# Patient Record
Sex: FEMALE | Race: ASIAN | Employment: FULL TIME | URBAN - METROPOLITAN AREA
[De-identification: names, ages, dates, MRNs, and addresses within clinical notes are randomized per-mention and may not be internally consistent; named-entity substitution may affect disease eponyms.]

---

## 2018-06-08 ENCOUNTER — THERAPY VISIT (OUTPATIENT)
Dept: PHYSICAL THERAPY | Facility: CLINIC | Age: 33
End: 2018-06-08
Payer: COMMERCIAL

## 2018-06-08 DIAGNOSIS — M54.2 CERVICALGIA: ICD-10-CM

## 2018-06-08 DIAGNOSIS — G44.219 EPISODIC TENSION-TYPE HEADACHE, NOT INTRACTABLE: ICD-10-CM

## 2018-06-08 DIAGNOSIS — M26.609 TEMPOROMANDIBULAR DYSFUNCTION SYNDROME: Primary | ICD-10-CM

## 2018-06-08 PROCEDURE — 97110 THERAPEUTIC EXERCISES: CPT | Mod: GP | Performed by: PHYSICAL THERAPIST

## 2018-06-08 PROCEDURE — 97140 MANUAL THERAPY 1/> REGIONS: CPT | Mod: GP | Performed by: PHYSICAL THERAPIST

## 2018-06-08 PROCEDURE — 97161 PT EVAL LOW COMPLEX 20 MIN: CPT | Mod: GP | Performed by: PHYSICAL THERAPIST

## 2018-06-08 NOTE — LETTER
[facility] ATHLETIC [facility] PHYSICAL THERAPY  [address]  [facility] [address]  [phone]    2018    Re: Adrianna Rehman   :   1985  MRN:  [id number]   REFERRING PHYSICIAN:   Maria Isabel Harris    [facility]  Date of Initial Evaluation:  18  Visits:  Rxs Used: 1  Reason for Referral:     Temporomandibular dysfunction syndrome  Cervicalgia  Episodic tension-type headache, not intractable    EVALUATION SUMMARY    [facility]    Subjective:  Patient is a 33 year old female presenting with rehab tmj hpi. The history is provided by the patient. No  was used.   Adrianna Rehman is a 33 year old female with a TMJ right condition.  Condition occurred with:  Other reason.  Condition occurred: in the community.  This is a new condition  In 2018 patient was [place] when she yawned to clear her ears and had a pop in the right jaw with pain and inability to close her jaw for about 10 seconds.  She has pain for 1 day and then in May she had the return of right jaw pain the day after playing volleyball. Pain has persisted and she saw Dr. Harris on  and referred to PT. She has no prior history of jaw pain or clicking. Chief complaints are of constant right face and neck pain and tightness which is worse with jaw opening and chewing. She has headaches twice a week which develop during the day.   Patient reports pain:  Face right, TMJ right and cervical right.  Radiates to:  Head.  Pain is described as aching and is constant Pain Scale: 2/10 at rest, 7/10 with activity.  Associated symptoms:  Joint noise, muscle tightness, tired or painful jaw muscles, headache cervical, headache temporal, locking: open or closed and stiffness/limited opening. Pain is the same all the time.  Symptoms are exacerbated by bushing/flossing,  chewing, talking, clenching, opening and yawning and relieved by nothing.  Since onset symptoms are gradually worsening.  General health as reported by patient is good.  Past medical history: prediabetes.  Medical allergies: no.  Other surgeries include:  None reported.  Current medications:  None as reported by the patient.  Current occupation is [employer].  Patient is working in normal job without restrictions.  Employment tasks: walking.    Objective:  Standing Alignment:    Cervical/Thoracic:  Forward head  Shoulder/UE:  Rounded shoulders, protracted scapula L and protracted scapula R        Leakhna Sherie       TMJ Evaluation  AROM Cervical:    Flexion: 100% Overpressure: Pain:  Extension: 100% R neck pain Overpressure: Pain:  Left Side Bend: 90% with R neck stretc Overpressure: Pain:  Right Side Bend: 100% Overpressure: Pain:  Left Rotation: 100% Overpressure: Pain:  Right Rotation: 100% Overpressure: Pain:  AROM TMJ:  Openin mm     Left Laterotrusion:  5 mm    Right Laterotrusion:  10  mm    Associated Findings: Headaches:  Cervical and temporal (twice per week developing during the day)  Parafunctional Habits:  Parafunctional habits: constant muscle guarding and fear avoidance reaction, very distraught.  Bruxism:  Unertain  Mandibular Habits:  No   Chewing/Biting Nails:  Left unilatilateral chew, left lip biting    Clenching:  Day and night    Caffeine:  Less than 1 cup per day    Aerobic Exercise:  Runs 3 miles twice per week, with increased pain the next day  Sleep Quality:  Poor sleep quality, wakes several time per night  Previous Interventions:    Dental/Orthotics:  Upper and lower retainers nightly, invisalign 1 year ago  Palpation:  Palpation/muscle tone tmj: moderate right greater than left with poor relaxation.  Left side tenderness present at:  Upper Trap; Levator; Erector Spinae; Superficial Masseter; Deep Masseter; Temporalis and Lateral Pterygoid  Right side tenderness present at:   Upper Trap; Levator; Erector Spinae; Superficial Masseter; Deep Masseter; Temporalis and Lateral Pterygoid  Movement Characteristics:    Click:  None noted, but guarded and limited motion  Deviations:  S curve deviation with deviation to L first, then R  Subluxation:  Mild bilaterally  Early Translation:  First L, then R    TMJ Findings:    Tongue Positioning:  Not palatal, teeth touch at rest  Mucosal Ridging:  None  Tongue Scalloping:  Some  Capsule Palpation:  Left side tenderness present at :  Lateral Capsule; Superior Capsule and Posterior Capsule  Right side tenderness not present at:  Lateral Capsule; Superior Capsule or Posterior Capsule                    Leakhna Sherie         Assessment/Plan:    Patient is a 33 year old female with right side TMJ complaints.    Patient has the following significant findings with corresponding treatment plan.                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    Comorbidity factors that impact the plan of care are:      pre diabetes.     Medications impacting care: None.  2) Examination of Body Systems comprised of:   Body structures and functions that impact the plan of care:      TMJ.   Activity limitations that impact the plan of care are:      eating, opening mouth, yawning.  3) Clinical presentation characteristics are:   Stable/Uncomplicated.  4) Decision-Making    Low complexity using standardized patient assessment instrument and/or measureable assessment of functional  outcome.  Cumulative Therapy Evaluation is: Low complexity.  Previous and current functional limitations:  (See Goal Flow Sheet for this information)    Short term and Long term goals: (See Goal Flow Sheet for this information)   Communication ability:  Patient appears to be able to clearly communicate and understand verbal and written communication and follow directions correctly.  Treatment Explanation - The following has been discussed with the patient:   RX ordered/plan of care  Anticipated outcomes  Possible risks and side effects  This patient would benefit from PT intervention to resume normal activities.   Rehab potential is good.  Frequency:  1 X week, once daily  Duration:  for 8 weeks  Discharge Plan:  Achieve all LTG.  Independent in home treatment program.  Reach maximal therapeutic benefit.    Please refer to the daily flowsheet for treatment today, total treatment time and time spent performing 1:1 timed codes.       Adrianna Rehman         Thank you for your referral.    INQUIRIES  Therapist: Kimberly Leon, PT  [facility] PH[facility]ICAL THERAP[facility]  [id number] [place] [address]  Phone: [phone]  Fax: [phone]

## 2018-06-08 NOTE — MR AVS SNAPSHOT
After Visit Summary   6/8/2018    Adrianna Rehman    MRN: [id number]           Patient Information     Date Of Birth          1985        Visit Information        Provider Department      6/8/2018 10:10 AM Kimberly Leon, [facility] [place] [facility] Physical Therapy        Today's Diagnoses     Temporomandibular dysfunction syndrome    -  1    Cervicalgia        Episodic tension-type headache, not intractable           Follow-ups after your visit        Your next 10 appointments already scheduled     Cooper 15, 2018  3:20 PM CDT   RAJAT Spine with Kimberly Leon PT   [facility] ([facility])    [address]  [facility] [id number] [phone]            Jun 22, 2018  3:20 PM CDT   RAJAT Spine with Kimberly Leon PT   [facility] [place] [facility] ([facility])    [address]  [facility] [address] [phone]              Who to contact     If you have questions or need follow up information about today's clinic visit or your schedule please contact [facility] ATHLETIC [facility] PHYSICAL THERAPY directly at [phone].  Normal or non-critical lab and imaging results will be communicated to you by MyChart, letter or phone within 4 business days after the clinic has received the results. If you do not hear from us within 7 days, please contact the clinic through [employer] or phone. If you have a critical or abnormal lab result, we will notify you by phone as soon as possible.  Submit refill requests through [email or web address] or call your pharmacy and they will forward the refill request to us. Please allow 3 business days for your refill to be completed.          Additional Information About Your Visit        [employer] Information     [email or web address] lets you send messages to your doctor, view your test results, renew your prescriptions, schedule appointments and more. To sign up, go to  [email or web address] . Click on \"Log in\" on the left side of the screen, which will take you to the Welcome page. Then click on \"Sign up Now\" on the right side of the page.     You will be asked to enter the access code listed below, as well as some personal information. Please follow the directions to create your username and password.     Your access code is: [email or web address]  Expires: 2018  6:01 PM     Your access code will  in 90 days. If you need help or a new code, please call your [place] clinic or [phone].        Care EveryWhere ID     This is your Care EveryWhere ID. This could be used by other organizations to access your [place] medical records  [id number]         Blood Pressure from Last 3 Encounters:   No data found for BP    Weight from Last 3 Encounters:   No data found for Wt              We Performed the Following     HC PT EVAL, LOW COMPLEXITY     RAJAT INITIAL EVAL REPORT     MANUAL THER TECH,1+REGIONS,EA 15 MIN     THERAPEUTIC EXERCISES        Primary Care Provider Office Phone # Fax #    [facility] [phone]       [address] [place] [address], [address]  [facility] [address]        Equal Access to Services     DORITA HESS AH: Hadii tyson white hadasho Soomaali, waaxda luqadaha, qaybta kaalmada adeegyada, merlene negrete. So [facility] [phone].    ATENCIÓN: Si habla español, tiene a ramos disposición servicios gratuitos de asistencia lingüística. Llame al [phone].    We comply with applicable federal civil rights laws and [place] laws. We do not discriminate on the basis of race, color, national origin, age, disability, sex, sexual orientation, or gender identity.            Thank you!     Thank you for choosing [facility] PHYSICAL THERAPY  for your care. Our goal is always to provide you with excellent care. Hearing back from our patients is one way we can continue to improve our services. Please take a few " minutes to complete the written survey that you may receive in the mail after your visit with us. Thank you!             Your Updated Medication List - Protect others around you: Learn how to safely use, store and throw away your medicines at www.disposemymeds.org.      Notice  As of 6/8/2018  6:01 PM    You have not been prescribed any medications.

## 2018-06-08 NOTE — LETTER
[facility] ATHLETIC [facility] PHYSICAL THERAPY  [address]  [facility] [address]  [phone]    August 10, 2018    Re: Adrianna Rehman   :   1985  MRN:  [id number]   REFERRING PHYSICIAN:   Maria Isabel Harris    [facility]  Date of Initial Evaluation:  18  Visits:  Rxs Used: 1  Reason for Referral: Temporomandibular dysfunction syndrome  Cervicalgia; Episodic tension-type headache, not intractable    EVALUATION SUMMARY    Addendum:  Patient's friend called and cancelled appointments on 6/15/18 and 18 stating she did not need them as her jaw was feeling better. Patient will be discharge after her initial evaluation with a minimal home exercise program.      [facility] Evaluation  Subjective:  Patient is a 33 year old female presenting with rehab tmj hpi. The history is provided by the patient. No  was used.   Adrianna Rehman is a 33 year old female with a TMJ right condition.  Condition occurred with:  Other reason.  Condition occurred: in the community.  This is a new condition  In 2018 patient was at the [place] when she yawned to clear her ears and had a pop in the right jaw with pain and inability to close her jaw for about 10 seconds.  She has pain for 1 day and then in May she had the return of right jaw pain the day after playing volleyball. Pain has persisted and she saw Dr. Harris on  and referred to PT. She has no prior history of jaw pain or clicking. Chief complaints are of constant right face and neck pain and tightness which is worse with jaw opening and chewing. She has headaches twice a week which develop during the day.   Patient reports pain:  Face right, TMJ right and cervical right.  Radiates to:  Head.  Pain is described as aching and is constant Pain Scale: 2/10 at rest, 7/10 with activity.  Associated  symptoms:  Joint noise, muscle tightness, tired or painful jaw muscles, headache cervical, headache temporal, locking: open or closed and stiffness/limited opening. Pain is the same all the time.  Symptoms are exacerbated by bushing/flossing, chewing, talking, clenching, opening and yawning and relieved by nothing.  Since onset symptoms are gradually worsening.        General health as reported by patient is good.  Past medical history: prediabetes.  Medical allergies: no.  Other surgeries include:  None reported.  Current medications:  None as reported by the patient.  Current occupation is [employer].  Patient is working in normal job without restrictions.  Employment tasks: walking.               Objective:  Standing Alignment:    Cervical/Thoracic:  Forward head  Shoulder/UE:  Rounded shoulders, protracted scapula L and protracted scapula R          Re: Leakhna Sherie   :   1985        TMJ Evaluation  ROM:   AROM Cervical:    Flexion: 100% Overpressure: Pain:  Extension: 100% R neck pain Overpressure: Pain:  Left Side Bend: 90% with R neck stretc Overpressure: Pain:  Right Side Bend: 100% Overpressure: Pain:  Left Rotation: 100% Overpressure: Pain:  Right Rotation: 100% Overpressure: Pain:  AROM TMJ:  Openin mm     Left Laterotrusion:  5 mm    Right Laterotrusion:  10  mm    Associated Findings: Headaches:  Cervical and temporal (twice per week developing during the day)  Parafunctional Habits:  Parafunctional habits: constant muscle guarding and fear avoidance reaction, very distraught.  Bruxism:  Unertain  Mandibular Habits:  No   Chewing/Biting Nails:  Left unilatilateral chew, left lip biting    Clenching:  Day and night    Caffeine:  Less than 1 cup per day    Aerobic Exercise:  Runs 3 miles twice per week, with increased pain the next day  Sleep Quality:  Poor sleep quality, wakes several time per night  Previous Interventions:    Dental/Orthotics:  Upper and lower retainers nightly,  invisalign 1 year ago  Palpation:  Palpation/muscle tone tmj: moderate right greater than left with poor relaxation.  Left side tenderness present at:  Upper Trap; Levator; Erector Spinae; Superficial Masseter; Deep Masseter; Temporalis and Lateral Pterygoid  Right side tenderness present at:  Upper Trap; Levator; Erector Spinae; Superficial Masseter; Deep Masseter; Temporalis and Lateral Pterygoid  Movement Characteristics:    Click:  None noted, but guarded and limited motion  Deviations:  S curve deviation with deviation to L first, then R  Subluxation:  Mild bilaterally  Early Translation:  First L, then R    TMJ Findings:    Tongue Positioning:  Not palatal, teeth touch at rest  Mucosal Ridging:  None  Tongue Scalloping:  Some  Capsule Palpation:  Left side tenderness present at :  Lateral Capsule; Superior Capsule and Posterior Capsule  Right side tenderness not present at:  Lateral Capsule; Superior Capsule or Posterior Capsule       Assessment/Plan:    Patient is a 33 year old female with right side TMJ complaints.    Patient has the following significant findings with corresponding treatment plan.                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    Comorbidity factors that impact the plan of care are:      pre diabetes.     Medications impacting care: None.  2) Examination of Body Systems comprised of:   Body structures and functions that impact the plan of care:       TMJ.   Activity limitations that impact the plan of care are:      eating, opening mouth, yawning.  3) Clinical presentation characteristics are:   Stable/Uncomplicated.  4) Decision-Making    Low complexity using standardized patient assessment instrument and/or measureable assessment of functional outcome.  Cumulative Therapy Evaluation is: Low complexity.    Previous and current functional limitations:  (See Goal Flow Sheet for this information)    Short term and Long term goals: (See Goal Flow Sheet for this information)     Communication ability:  Patient appears to be able to clearly communicate and understand verbal and written communication and follow directions correctly.  Treatment Explanation - The following has been discussed with the patient:   RX ordered/plan of care  Anticipated outcomes  Possible risks and side effects  This patient would benefit from PT intervention to resume normal activities.   Rehab potential is good.    Frequency:  1 X week, once daily  Duration:  for 8 weeks  Discharge Plan:  Achieve all LTG.  Independent in home treatment program.  Reach maximal therapeutic benefit.    Thank you for your referral.    INQUIRIES  Therapist: Kimberly Leon, PT   [facility] [place] [facility]  [id number] [place] [address] [address] [address]  Phone: [phone]  Fax: [phone]

## 2018-06-08 NOTE — PROGRESS NOTES
Addendum:  Patient's friend called and cancelled appointments on 6/15/18 and 6/22/18 stating she did not need them as her jaw was feeling better. Patient will be discharge after her initial evaluation with a minimal home exercise program.      [facility] Initial Evaluation  Subjective:  Patient is a 33 year old female presenting with rehab tmj hpi. The history is provided by the patient. No  was used.   Adrianna Rehman is a 33 year old female with a TMJ right condition.  Condition occurred with:  Other reason.  Condition occurred: in the community.  This is a new condition  In February 2018 patient was at the [place] when she yawned to clear her ears and had a pop in the right jaw with pain and inability to close her jaw for about 10 seconds.  She has pain for 1 day and then in May she had the return of right jaw pain the day after playing volleyball. Pain has persisted and she saw Dr. Harris on June 5th and referred to PT. She has no prior history of jaw pain or clicking. Chief complaints are of constant right face and neck pain and tightness which is worse with jaw opening and chewing. She has headaches twice a week which develop during the day. .    Patient reports pain:  Face right, TMJ right and cervical right.  Radiates to:  Head.  Pain is described as aching and is constant Pain Scale: 2/10 at rest, 7/10 with activity.  Associated symptoms:  Joint noise, muscle tightness, tired or painful jaw muscles, headache cervical, headache temporal, locking: open or closed and stiffness/limited opening. Pain is the same all the time.  Symptoms are exacerbated by bushing/flossing, chewing, talking, clenching, opening and yawning and relieved by nothing.  Since onset symptoms are gradually worsening.        General health as reported by patient is good.  Past medical history: prediabetes.  Medical allergies: no.  Other surgeries include:  None reported.  Current  medications:  None as reported by the patient.  Current occupation is [employer].  Patient is working in normal job without restrictions.  Employment tasks: walking.                                Objective:  Standing Alignment:    Cervical/Thoracic:  Forward head  Shoulder/UE:  Rounded shoulders, protracted scapula L and protracted scapula R                                                          TMJ Evaluation  ROM:     AROM Cervical:    Flexion: 100% Overpressure: Pain:  Extension: 100% R neck pain Overpressure: Pain:  Left Side Bend: 90% with R neck stretc Overpressure: Pain:  Right Side Bend: 100% Overpressure: Pain:  Left Rotation: 100% Overpressure: Pain:  Right Rotation: 100% Overpressure: Pain:  AROM TMJ:  Openin mm     Left Laterotrusion:  5 mm    Right Laterotrusion:  10  mm          Associated Findings: Headaches:  Cervical and temporal (twice per week developing during the day)  Parafunctional Habits:  Parafunctional habits: constant muscle guarding and fear avoidance reaction, very distraught.  Bruxism:  Unertain  Mandibular Habits:  No   Chewing/Biting Nails:  Left unilatilateral chew, left lip biting    Clenching:  Day and night    Caffeine:  Less than 1 cup per day    Aerobic Exercise:  Runs 3 miles twice per week, with increased pain the next day  Sleep Quality:  Poor sleep quality, wakes several time per night    Previous Interventions:      Dental/Orthotics:  Upper and lower retainers nightly, invisalign 1 year ago  Palpation:  Palpation/muscle tone tmj: moderate right greater than left with poor relaxation.  Left side tenderness present at:  Upper Trap; Levator; Erector Spinae; Superficial Masseter; Deep Masseter; Temporalis and Lateral Pterygoid  Right side tenderness present at:  Upper Trap; Levator; Erector Spinae; Superficial Masseter; Deep Masseter; Temporalis and Lateral Pterygoid    Movement Characteristics:    Click:  None noted, but guarded and limited  motion    Deviations:  S curve deviation with deviation to L first, then R  Subluxation:  Mild bilaterally  Early Translation:  First L, then R    TMJ Findings:    Tongue Positioning:  Not palatal, teeth touch at rest  Mucosal Ridging:  None  Tongue Scalloping:  Some      Capsule Palpation:  Left side tenderness present at :  Lateral Capsule; Superior Capsule and Posterior Capsule    Right side tenderness not present at:  Lateral Capsule; Superior Capsule or Posterior Capsule                General     ROS    Assessment/Plan:    Patient is a 33 year old female with right side TMJ complaints.    Patient has the following significant findings with corresponding treatment plan.                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    Comorbidity factors that impact the plan of care are:      pre diabetes.     Medications impacting care: None.  2) Examination of Body Systems comprised of:   Body structures and functions that impact the plan of care:      TMJ.   Activity limitations that impact the plan of care are:      eating, opening mouth, yawning.  3) Clinical presentation characteristics are:   Stable/Uncomplicated.  4) Decision-Making    Low complexity using standardized patient assessment instrument and/or measureable assessment of functional outcome.  Cumulative Therapy Evaluation is: Low complexity.    Previous and current functional limitations:  (See Goal Flow Sheet for this information)    Short term and Long term  goals: (See Goal Flow Sheet for this information)     Communication ability:  Patient appears to be able to clearly communicate and understand verbal and written communication and follow directions correctly.  Treatment Explanation - The following has been discussed with the patient:   RX ordered/plan of care  Anticipated outcomes  Possible risks and side effects  This patient would benefit from PT intervention to resume normal activities.   Rehab potential is good.    Frequency:  1 X week, once daily  Duration:  for 8 weeks  Discharge Plan:  Achieve all LTG.  Independent in home treatment program.  Reach maximal therapeutic benefit.    Please refer to the daily flowsheet for treatment today, total treatment time and time spent performing 1:1 timed codes.

## 2018-08-10 PROBLEM — M54.2 CERVICALGIA: Status: RESOLVED | Noted: 2018-06-08 | Resolved: 2018-08-10

## 2018-08-10 PROBLEM — G44.219 EPISODIC TENSION-TYPE HEADACHE, NOT INTRACTABLE: Status: RESOLVED | Noted: 2018-06-08 | Resolved: 2018-08-10

## 2018-08-10 PROBLEM — M26.609 TEMPOROMANDIBULAR DYSFUNCTION SYNDROME: Status: RESOLVED | Noted: 2018-06-08 | Resolved: 2018-08-10

## 2018-11-30 ENCOUNTER — APPOINTMENT (OUTPATIENT)
Dept: CT IMAGING | Facility: CLINIC | Age: 33
End: 2018-11-30
Attending: EMERGENCY MEDICINE
Payer: COMMERCIAL

## 2018-11-30 ENCOUNTER — APPOINTMENT (OUTPATIENT)
Dept: ULTRASOUND IMAGING | Facility: CLINIC | Age: 33
End: 2018-11-30
Attending: EMERGENCY MEDICINE
Payer: COMMERCIAL

## 2018-11-30 ENCOUNTER — HOSPITAL ENCOUNTER (EMERGENCY)
Facility: CLINIC | Age: 33
Discharge: HOME | End: 2018-11-30
Attending: EMERGENCY MEDICINE | Admitting: EMERGENCY MEDICINE
Payer: COMMERCIAL

## 2018-11-30 VITALS
HEART RATE: 74 BPM | BODY MASS INDEX: 23.37 KG/M2 | OXYGEN SATURATION: 100 % | WEIGHT: 127 LBS | HEIGHT: 62 IN | TEMPERATURE: 98.4 F | RESPIRATION RATE: 16 BRPM | SYSTOLIC BLOOD PRESSURE: 128 MMHG | DIASTOLIC BLOOD PRESSURE: 40 MMHG

## 2018-11-30 DIAGNOSIS — N20.1 URETEROLITHIASIS: ICD-10-CM

## 2018-11-30 DIAGNOSIS — N23 RENAL COLIC: ICD-10-CM

## 2018-11-30 LAB
ALBUMIN SERPL-MCNC: 3.8 G/DL (ref 3.4–5)
ALBUMIN UR-MCNC: NEGATIVE MG/DL
ALP SERPL-CCNC: 80 U/L (ref 40–150)
ALT SERPL W P-5'-P-CCNC: 20 U/L (ref 0–50)
ANION GAP SERPL CALCULATED.3IONS-SCNC: 7 MMOL/L (ref 3–14)
APPEARANCE UR: CLEAR
AST SERPL W P-5'-P-CCNC: 16 U/L (ref 0–45)
B-HCG FREE SERPL-ACNC: <5 IU/L
BACTERIA #/AREA URNS HPF: ABNORMAL /HPF
BASOPHILS # BLD AUTO: 0 10E9/L (ref 0–0.2)
BASOPHILS NFR BLD AUTO: 0.4 %
BILIRUB SERPL-MCNC: 0.4 MG/DL (ref 0.2–1.3)
BILIRUB UR QL STRIP: NEGATIVE
BUN SERPL-MCNC: 11 MG/DL (ref 7–30)
CALCIUM SERPL-MCNC: 8.4 MG/DL (ref 8.5–10.1)
CHLORIDE SERPL-SCNC: 107 MMOL/L (ref 94–109)
CO2 SERPL-SCNC: 25 MMOL/L (ref 20–32)
COLOR UR AUTO: YELLOW
CREAT SERPL-MCNC: 0.55 MG/DL (ref 0.52–1.04)
DIFFERENTIAL METHOD BLD: NORMAL
EOSINOPHIL # BLD AUTO: 0.1 10E9/L (ref 0–0.7)
EOSINOPHIL NFR BLD AUTO: 0.8 %
ERYTHROCYTE [DISTWIDTH] IN BLOOD BY AUTOMATED COUNT: 12.4 % (ref 10–15)
GFR SERPL CREATININE-BSD FRML MDRD: >90 ML/MIN/1.7M2
GLUCOSE SERPL-MCNC: 90 MG/DL (ref 70–99)
GLUCOSE UR STRIP-MCNC: NEGATIVE MG/DL
HCT VFR BLD AUTO: 41 % (ref 35–47)
HGB BLD-MCNC: 13.5 G/DL (ref 11.7–15.7)
HGB UR QL STRIP: ABNORMAL
IMM GRANULOCYTES # BLD: 0 10E9/L (ref 0–0.4)
IMM GRANULOCYTES NFR BLD: 0.3 %
KETONES UR STRIP-MCNC: NEGATIVE MG/DL
LEUKOCYTE ESTERASE UR QL STRIP: NEGATIVE
LIPASE SERPL-CCNC: 148 U/L (ref 73–393)
LYMPHOCYTES # BLD AUTO: 1.9 10E9/L (ref 0.8–5.3)
LYMPHOCYTES NFR BLD AUTO: 25 %
MCH RBC QN AUTO: 31.3 PG (ref 26.5–33)
MCHC RBC AUTO-ENTMCNC: 32.9 G/DL (ref 31.5–36.5)
MCV RBC AUTO: 95 FL (ref 78–100)
MONOCYTES # BLD AUTO: 0.8 10E9/L (ref 0–1.3)
MONOCYTES NFR BLD AUTO: 9.9 %
MUCOUS THREADS #/AREA URNS LPF: PRESENT /LPF
NEUTROPHILS # BLD AUTO: 4.9 10E9/L (ref 1.6–8.3)
NEUTROPHILS NFR BLD AUTO: 63.6 %
NITRATE UR QL: NEGATIVE
NRBC # BLD AUTO: 0 10*3/UL
NRBC BLD AUTO-RTO: 0 /100
PH UR STRIP: 5 PH (ref 5–7)
PLATELET # BLD AUTO: 241 10E9/L (ref 150–450)
POTASSIUM SERPL-SCNC: 3.4 MMOL/L (ref 3.4–5.3)
PROT SERPL-MCNC: 7.7 G/DL (ref 6.8–8.8)
RBC # BLD AUTO: 4.32 10E12/L (ref 3.8–5.2)
RBC #/AREA URNS AUTO: 71 /HPF (ref 0–2)
SODIUM SERPL-SCNC: 139 MMOL/L (ref 133–144)
SOURCE: ABNORMAL
SP GR UR STRIP: 1.01 (ref 1–1.03)
SQUAMOUS #/AREA URNS AUTO: 1 /HPF (ref 0–1)
UROBILINOGEN UR STRIP-MCNC: 0 MG/DL (ref 0–2)
WBC # BLD AUTO: 7.6 10E9/L (ref 4–11)
WBC #/AREA URNS AUTO: 1 /HPF (ref 0–5)

## 2018-11-30 PROCEDURE — 81001 URINALYSIS AUTO W/SCOPE: CPT | Performed by: EMERGENCY MEDICINE

## 2018-11-30 PROCEDURE — 85025 COMPLETE CBC W/AUTO DIFF WBC: CPT | Performed by: EMERGENCY MEDICINE

## 2018-11-30 PROCEDURE — 76705 ECHO EXAM OF ABDOMEN: CPT

## 2018-11-30 PROCEDURE — 74176 CT ABD & PELVIS W/O CONTRAST: CPT

## 2018-11-30 PROCEDURE — 80053 COMPREHEN METABOLIC PANEL: CPT | Performed by: EMERGENCY MEDICINE

## 2018-11-30 PROCEDURE — 83690 ASSAY OF LIPASE: CPT | Performed by: EMERGENCY MEDICINE

## 2018-11-30 PROCEDURE — 84702 CHORIONIC GONADOTROPIN TEST: CPT

## 2018-11-30 PROCEDURE — 99285 EMERGENCY DEPT VISIT HI MDM: CPT | Mod: 25

## 2018-11-30 RX ORDER — HYDROCODONE BITARTRATE AND ACETAMINOPHEN 5; 325 MG/1; MG/1
1-2 TABLET ORAL EVERY 6 HOURS PRN
Qty: 15 TABLET | Refills: 0 | Status: SHIPPED | OUTPATIENT
Start: 2018-11-30

## 2018-11-30 RX ORDER — TAMSULOSIN HYDROCHLORIDE 0.4 MG/1
0.4 CAPSULE ORAL DAILY
Qty: 10 CAPSULE | Refills: 0 | Status: SHIPPED | OUTPATIENT
Start: 2018-11-30 | End: 2018-12-10

## 2018-11-30 ASSESSMENT — ENCOUNTER SYMPTOMS
VOMITING: 0
DYSURIA: 0
FREQUENCY: 0
BLOOD IN STOOL: 0
BACK PAIN: 0
NAUSEA: 0
ABDOMINAL PAIN: 1

## 2018-11-30 NOTE — ED AVS SNAPSHOT
[facility] Emergency Department    [address]    [facility] [address]    Phone:  [phone]    Fax:  [phone]                                       Adrianna Rehman   MRN: [id number]    Department:  [facility] Emergency Department   Date of Visit:  11/30/2018           Patient Information     Date Of Birth          1985        Your diagnoses for this visit were:     Ureterolithiasis     Renal colic        You were seen by Terry Royal MD.      Follow-up Information     Follow up with Anish Jewell In 3 days.    Contact information:    [address], [address]  [place] [address]  [phone]          Follow up with [facility] Emergency Department.    Specialty:  EMERGENCY MEDICINE    Why:  If symptoms worsen    Contact information:    [address]  [facility] [address] [phone]        Call Nathaniel Bland MD.    Specialty:  Urology    Why:  To schedule consultation at your convenience    Contact information:    [address] [facility] [address]  [facility] [address] [phone]          Discharge Instructions       Discharge Instructions  Kidney Stones    Kidney stones are a common problem that can cause a lot of pain but fortunately are usually not dangerous. Kidney stones form in the kidney and then can cause a blockage (obstruction) of the flow of urine from the kidney which leads to pain. Most patients can manage kidney stones at home (without a hospital stay).  However, sometimes your condition may be worse than it seemed at first, or may get worse with time. Most kidney stones will pass on their own, but occasionally stones may need to be removed by an urologist.    Generally, every Emergency Department visit should have a follow-up clinic visit with either a primary or a specialty clinic/provider. Please follow-up as instructed by your emergency provider today.      Return to the Emergency Department  if:    Your pain is not controlled despite the medications provided or recommended.    You are vomiting (throwing up) and cannot keep fluids or medications down.    You develop a fever (>100.4 F).    You feel much more ill or develop new symptoms.  What can I do to help myself?    Be sure to drink plenty of fluids.    If instructed to do so, strain your urine (pee) with the urine strainer you were provided with today. Your stone may look like a grain of sand or a small pebble. Collect any stones in the cup provided and bring to your follow-up appointment.    Staying active is good, and may help the stone to pass. You may do whatever you feel up to doing without restrictions.   Treatment:    Non-steroidal anti-inflammatory drugs (NSAIDs). This includes prescription medicines like Toradol  (ketorolac) and non-prescription medicines like Advil  (ibuprofen) and Nuprin  (ibuprofen) and Naproxen. These pain relievers are very effective for kidney stones.    Nausea (sick to your stomach) medication.  Nausea and vomiting are common with kidney stones, so your provider may send you home with medicine for this.     Flomax  (tamsulosin). This medicine is sometimes used for men with prostate problems, but also can help kidney stones to pass. Its effectiveness is controversial or questionable so it is prescribed in certain situations. This medicine can lower blood pressure, and you may feel faint/lightheaded, especially when you first stand up. Be sure to get up gradually, sit down if you feel faint, and avoid activity where feeling faint would be dangerous, such as climbing ladders.  If you were given a prescription for medicine here today, be sure to read all of the information (including the package insert) that comes with your prescription.  This will include important information about the medicine, its side effects, and any warnings that you need to know about.  The pharmacist who fills the prescription can provide more  information and answer questions you may have about the medicine.  If you have questions or concerns that the pharmacist cannot address, please call or return to the Emergency Department.   Remember that you can always come back to the Emergency Department if you are not able to see your regular provider in the amount of time listed above, if you get any new symptoms, or if there is anything that worries you.      Opioid Medication Information    You have been given a prescription for an opioid (narcotic) pain medicine and/or have received a pain medicine while here in the Emergency Department. These medicines can make you drowsy or impaired. You must not drive, operate dangerous equipment, or engage in any other dangerous activities while taking these medications. If you drive while taking these medications, you could be arrested for driving under the influence (DUI). Do not drink any alcohol while you are taking these medications.     Opioid pain medications can cause addiction. If you have a history of chemical dependency of any type, you are at a higher risk of becoming addicted to pain medications.  Only take these prescribed medications to treat your pain when all other options have been tried. Take it for as short a time and as few doses as possible. Store your pain pills in a secure place, as they are frequently stolen and provide a dangerous opportunity for children or visitors in your house to start abusing these powerful medications. We will not replace any lost or stolen medicine.    If you do not finish your medication, it is a good idea to get rid of it but please do not flush it down the toilet. Please dispose of the remaining medication at a local pharmacy or law enforcement facility. The [place] [employer] has additional information on medication disposal: https://[email or web address].[facility].[email or web address]/living-green/managing-unwanted-medications.      Many prescription pain medications contain Tylenol   (acetaminophen), including Vicodin , Tylenol #3 , Norco , Lortab , and Percocet .  You should not take any extra pills of Tylenol  if you are using these prescription medications or you can get very sick.  Do not ever take more than 3000 mg of acetaminophen in any 24 hour period.    All opioids tend to cause constipation. Drink plenty of water and eat foods that have a lot of fiber, such as fruits, vegetables, prune juice, apple juice and high fiber cereal.  Take a laxative if you don t move your bowels at least every other day. Miralax , Milk of Magnesia, Colace , or Senna  can be used to keep you regular.          24 Hour Appointment Hotline       To make an appointment at any [facility], call [phone] ([phone]). If you don't have a family doctor or clinic, we will help you find one. [facility] clinics are conveniently located to serve the needs of you and your family.             Review of your medicines      START taking        Dose / Directions Last dose taken    HYDROcodone-acetaminophen 5-325 MG tablet   Commonly known as:  NORCO   Dose:  1-2 tablet   Quantity:  15 tablet        Take 1-2 tablets by mouth every 6 hours as needed for breakthrough pain or pain   Refills:  0        tamsulosin 0.4 MG capsule   Commonly known as:  FLOMAX   Dose:  0.4 mg   Quantity:  10 capsule        Take 1 capsule (0.4 mg) by mouth daily for 10 doses   Refills:  0                Information about OPIOIDS     PRESCRIPTION OPIOIDS: WHAT YOU NEED TO KNOW   We gave you an opioid (narcotic) pain medicine. It is important to manage your pain, but opioids are not always the best choice. You should first try all the other options your care team gave you. Take this medicine for as short a time (and as few doses) as possible.    Some activities can increase your pain, such as bandage changes or therapy sessions. It may help to take your pain medicine 30 to 60 minutes before these activities. Reduce your stress by getting  enough sleep, working on hobbies you enjoy and practicing relaxation or meditation. Talk to your care team about ways to manage your pain beyond prescription opioids.    These medicines have risks:    DO NOT drive when on new or higher doses of pain medicine. These medicines can affect your alertness and reaction times, and you could be arrested for driving under the influence (DUI). If you need to use opioids long-term, talk to your care team about driving.    DO NOT operate heavy machinery    DO NOT do any other dangerous activities while taking these medicines.    DO NOT drink any alcohol while taking these medicines.     If the opioid prescribed includes acetaminophen, DO NOT take with any other medicines that contain acetaminophen. Read all labels carefully. Look for the word  acetaminophen  or  Tylenol.  Ask your pharmacist if you have questions or are unsure.    You can get addicted to pain medicines, especially if you have a history of addiction (chemical, alcohol or substance dependence). Talk to your care team about ways to reduce this risk.    All opioids tend to cause constipation. Drink plenty of water and eat foods that have a lot of fiber, such as fruits, vegetables, prune juice, apple juice and high-fiber cereal. Take a laxative (Miralax, milk of magnesia, Colace, Senna) if you don t move your bowels at least every other day. Other side effects include upset stomach, sleepiness, dizziness, throwing up, tolerance (needing more of the medicine to have the same effect), physical dependence and slowed breathing.    Store your pills in a secure place, locked if possible. We will not replace any lost or stolen medicine. If you don t finish your medicine, please throw away (dispose) as directed by your pharmacist. The [place] Pollution Control Agency has more information about safe disposal: https://www.pca.state.[place]/living-green/managing-unwanted-medications        Prescriptions were sent or printed at  these locations (2 Prescriptions)                   Other Prescriptions                Printed at Department/Unit printer (2 of 2)         HYDROcodone-acetaminophen (NORCO) 5-325 MG tablet               tamsulosin (FLOMAX) 0.4 MG capsule                Procedures and tests performed during your visit     Abd/pelvis CT no contrast - Stone Protocol    Abdomen US, limited (RUQ only)    CBC with platelets differential    Comprehensive metabolic panel    ISTAT HCG Quantitative Pregnancy POCT    Lipase    Routine UA with microscopic      Orders Needing Specimen Collection     None      Pending Results     No orders found from 11/28/2018 to 12/1/2018.            Pending Culture Results     No orders found from 11/28/2018 to 12/1/2018.            Pending Results Instructions     If you had any lab results that were not finalized at the time of your Discharge, you can call the ED Lab Result RN at [phone]. You will be contacted by this team for any positive Lab results or changes in treatment. The nurses are available 7 days a week from 10A to 6:30P.  You can leave a message 24 hours per day and they will return your call.        Test Results From Your Hospital Stay        11/30/2018  7:25 AM      Component Results     Component Value Ref Range & Units Status    Color Urine Yellow  Final    Appearance Urine Clear  Final    Glucose Urine Negative NEG^Negative mg/dL Final    Bilirubin Urine Negative NEG^Negative Final    Ketones Urine Negative NEG^Negative mg/dL Final    Specific Gravity Urine 1.009 1.003 - 1.035 Final    Blood Urine Large (A) NEG^Negative Final    pH Urine 5.0 5.0 - 7.0 pH Final    Protein Albumin Urine Negative NEG^Negative mg/dL Final    Urobilinogen mg/dL 0.0 0.0 - 2.0 mg/dL Final    Nitrite Urine Negative NEG^Negative Final    Leukocyte Esterase Urine Negative NEG^Negative Final    Source Midstream Urine  Final    WBC Urine 1 0 - 5 /HPF Final    RBC Urine 71 (H) 0 - 2 /HPF Final    Bacteria Urine Few  (A) NEG^Negative /HPF Final    Squamous Epithelial /HPF Urine 1 0 - 1 /HPF Final    Mucous Urine Present (A) NEG^Negative /LPF Final         11/30/2018  6:23 AM      Component Results     Component Value Ref Range & Units Status    WBC 7.6 4.0 - 11.0 10e9/L Final    RBC Count 4.32 3.8 - 5.2 10e12/L Final    Hemoglobin 13.5 11.7 - 15.7 g/dL Final    Hematocrit 41.0 35.0 - 47.0 % Final    MCV 95 78 - 100 fl Final    MCH 31.3 26.5 - 33.0 pg Final    MCHC 32.9 31.5 - 36.5 g/dL Final    RDW 12.4 10.0 - 15.0 % Final    Platelet Count 241 150 - 450 10e9/L Final    Diff Method Automated Method  Final    % Neutrophils 63.6 % Final    % Lymphocytes 25.0 % Final    % Monocytes 9.9 % Final    % Eosinophils 0.8 % Final    % Basophils 0.4 % Final    % Immature Granulocytes 0.3 % Final    Nucleated RBCs 0 0 /100 Final    Absolute Neutrophil 4.9 1.6 - 8.3 10e9/L Final    Absolute Lymphocytes 1.9 0.8 - 5.3 10e9/L Final    Absolute Monocytes 0.8 0.0 - 1.3 10e9/L Final    Absolute Eosinophils 0.1 0.0 - 0.7 10e9/L Final    Absolute Basophils 0.0 0.0 - 0.2 10e9/L Final    Abs Immature Granulocytes 0.0 0 - 0.4 10e9/L Final    Absolute Nucleated RBC 0.0  Final         11/30/2018  6:42 AM      Component Results     Component Value Ref Range & Units Status    Sodium 139 133 - 144 mmol/L Final    Potassium 3.4 3.4 - 5.3 mmol/L Final    Chloride 107 94 - 109 mmol/L Final    Carbon Dioxide 25 20 - 32 mmol/L Final    Anion Gap 7 3 - 14 mmol/L Final    Glucose 90 70 - 99 mg/dL Final    Urea Nitrogen 11 7 - 30 mg/dL Final    Creatinine 0.55 0.52 - 1.04 mg/dL Final    GFR Estimate >90 >60 mL/min/1.7m2 Final    Non  GFR Calc    GFR Estimate If Black >90 >60 mL/min/1.7m2 Final    African American GFR Calc    Calcium 8.4 (L) 8.5 - 10.1 mg/dL Final    Bilirubin Total 0.4 0.2 - 1.3 mg/dL Final    Albumin 3.8 3.4 - 5.0 g/dL Final    Protein Total 7.7 6.8 - 8.8 g/dL Final    Alkaline Phosphatase 80 40 - 150 U/L Final    ALT 20 0 - 50 U/L  Final    AST 16 0 - 45 U/L Final         11/30/2018  6:42 AM      Component Results     Component Value Ref Range & Units Status    Lipase 148 73 - 393 U/L Final         11/30/2018  7:56 AM      Narrative     ULTRASOUND ABDOMEN LIMITED  11/30/2018 7:18 AM     HISTORY:  Right upper quadrant tenderness, pain, colicky.      FINDINGS:  Liver is normal in echogenicity without focal lesions. The  gallbladder is normal without stones or sludge. Incidental phrygian  cap is noted along the fundus of the gallbladder and is a normal  anatomic variant. Common bile duct is normal in diameter. Pancreas is  normal where visualized. Examination of the right kidney demonstrates  mild fluid distention of the renal collecting system and calyces,  possibly mild hydronephrosis.        Impression     IMPRESSION:  Mild right hydronephrosis. No gallstones or bile duct  dilatation.    RAMON DENISE MD         11/30/2018  6:56 AM      Component Results     Component Value Ref Range & Units Status    HCG Quantitative Serum <5.0 <5.0 IU/L Final         11/30/2018  9:05 AM      Narrative     CT ABDOMEN AND PELVIS WITHOUT CONTRAST 11/30/2018 8:24 AM     HISTORY: Right hydronephrosis, right-sided pain.     TECHNIQUE: Axial images are obtained from the lung bases to the  symphysis without oral or IV contrast.  Coronal reformatted images are  also generated.  Radiation dose for this scan was reduced using  automated exposure control, adjustment of the mA and/or kV according  to patient size, or iterative reconstruction technique.    FINDINGS:  The lung bases are clear.    Abdomen: Mild right hydronephrosis and hydroureter is evident due to a  0.3 cm right UVJ stone on series 2, image 72. No other right urinary  tract calculi. There is a tiny 0.2 cm nonobstructing stone left renal  collecting system on series 2, image 24. No left hydronephrosis or  ureteral calculi.    The upper abdominal organs are otherwise within normal limits allowing  for the  noncontrast technique, including the liver, spleen,  gallbladder, pancreas and adrenal glands. No enlarged lymph nodes. The  bowel is normal in caliber without obstruction or diverticulitis.  Appendix is normal.    Pelvis: Bladder is decompressed. No bladder calculi are noted. The  uterus, right ovary and rectum are unremarkable. Dominant follicle  noted in the left ovary on series 2, image 65. No enlarged pelvic or  inguinal lymph nodes. No free pelvic fluid. Bone window examination is  within normal limits.        Impression     IMPRESSION: A 0.3 cm obstructing right UVJ stone causes mild right  hydronephrosis and hydroureter. Nonobstructing left renal collecting  system stone is present. No left hydronephrosis. No additional urinary  tract calculi. No other acute changes are evident in the abdomen or  pelvis to account for patient's symptoms. Appendix is normal.    RAMON DENISE MD                Clinical Quality Measure: Blood Pressure Screening     Your blood pressure was checked while you were in the emergency department today. The last reading we obtained was  BP: 128/40 . Please read the guidelines below about what these numbers mean and what you should do about them.  If your systolic blood pressure (the top number) is less than 120 and your diastolic blood pressure (the bottom number) is less than 80, then your blood pressure is normal. There is nothing more that you need to do about it.  If your systolic blood pressure (the top number) is 120-139 or your diastolic blood pressure (the bottom number) is 80-89, your blood pressure may be higher than it should be. You should have your blood pressure rechecked within a year by a primary care provider.  If your systolic blood pressure (the top number) is 140 or greater or your diastolic blood pressure (the bottom number) is 90 or greater, you may have high blood pressure. High blood pressure is treatable, but if left untreated over time it can put you at risk for  "heart attack, stroke, or kidney failure. You should have your blood pressure rechecked by a primary care provider within the next 4 weeks.  If your provider in the emergency department today gave you specific instructions to follow-up with your doctor or provider even sooner than that, you should follow that instruction and not wait for up to 4 weeks for your follow-up visit.        Thank you for choosing [facility]       Thank you for choosing [facility] for your care. Our goal is always to provide you with excellent care. Hearing back from our patients is one way we can continue to improve our services. Please take a few minutes to complete the written survey that you may receive in the mail after you visit with us. Thank you!        [employer] Information     [employer] lets you send messages to your doctor, view your test results, renew your prescriptions, schedule appointments and more. To sign up, go to [email or web address] . Click on \"Log in\" on the left side of the screen, which will take you to the Welcome page. Then click on \"Sign up Now\" on the right side of the page.     You will be asked to enter the access code listed below, as well as some personal information. Please follow the directions to create your username and password.     Your access code is: [email or web address]  Expires: 2019  9:21 AM     Your access code will  in 90 days. If you need help or a new code, please call your [facility] clinic or [phone].        Care EveryWhere ID     This is your Care EveryWhere ID. This could be used by other organizations to access your [facility] medical records  [id number]        Equal Access to Services     DORITA HESS : Hadsumeet Barreto, waaxda luqadaha, qaybta kaalmada marjan, merlene negrete. So [facility] [phone].    ATENCIÓN: Si habla español, tiene a ramos disposición servicios gratuitos de asistencia lingüística. Llame al [phone].    We comply with " applicable federal civil rights laws and [place] laws. We do not discriminate on the basis of race, color, national origin, age, disability, sex, sexual orientation, or gender identity.            After Visit Summary       This is your record. Keep this with you and show to your community pharmacist(s) and doctor(s) at your next visit.

## 2018-11-30 NOTE — ED AVS SNAPSHOT
[facility] Emergency Department    [address]    [facility] [address]    Phone:  [phone]    Fax:  [phone]                                       Adrianna Rehman   MRN: [id number]    Department:  [facility] Emergency Department   Date of Visit:  11/30/2018           After Visit Summary Signature Page     I have received my discharge instructions, and my questions have been answered. I have discussed any challenges I see with this plan with the nurse or doctor.    ..........................................................................................................................................  Patient/Patient Representative Signature      ..........................................................................................................................................  Patient Representative Print Name and Relationship to Patient    ..................................................               ................................................  Date                                   Time    ..........................................................................................................................................  Reviewed by Signature/Title    ...................................................              ..............................................  Date                                               Time          22EPIC Rev 08/18

## 2018-11-30 NOTE — ED PROVIDER NOTES
"  History     Chief Complaint:  Abdominal Pain    HPI   Adrianna Rehman is a 33 year old female who presents to the emergency department today for evaluation of abdominal pain.  The patient reports that starting around 0440 she started having pain in her right upper quadrant. She states she was already awake when the pain came on. She states the pain has subsided slightly. The patient states her last menstrual period was on 11/15/18. The patient denies nausea or vomiting, blood in her stool, back pain, dysuria, or urinary frequency.    Allergies:  No Known Allergies     Medications:    Zyrtec   Imitrex  Compazine   Zantac    Past Medical History:    Hypercholesteremia  Pre-diabetes  Migraines     Past Surgical History:    History reviewed. No pertinent surgical history.    Family History:    Brother: prediabetes     Social History:  The patient was accompanied to the ED by her significant other.  Smoking Status: Never Smoker  Smokeless Tobacco: Never Used  Alcohol Use: Positive   Marital Status:  Single      Review of Systems   Gastrointestinal: Positive for abdominal pain. Negative for blood in stool, nausea and vomiting.   Genitourinary: Negative for dysuria and frequency.   Musculoskeletal: Negative for back pain.   All other systems reviewed and are negative.    Physical Exam     Patient Vitals for the past 24 hrs:   BP Temp Temp src Pulse Heart Rate Resp SpO2 Height Weight   11/30/18 0717 105/70 - - - 62 16 100 % - -   11/30/18 0645 104/72 - - - - - 100 % - -   11/30/18 0543 121/90 98.4  F (36.9  C) Oral 74 74 14 100 % 1.575 m (5' 2\") 57.6 kg (127 lb)      Physical Exam  General: Alert, no acute distress; nontoxic appearing  HEENT:  Moist mucous membranes.  Posterior oropharynx clear, no exudates.  Conjunctiva normal  CV:  RRR, no m/r/g, skin warm and well perfused  Pulm:  CTAB, no wheezes/ronchi/rales.  No acute distress, breathing comfortably  GI:  Soft, RUQ tenderness, nondistended.  No rebound or guarding.  " Normal bowel sounds  MSK:  Moving all extremities.  No focal areas of edema, erythema, no CVA tenderness  Skin:  WWP, no rashes, no lower extremity edema, skin color normal, no diaphoresis  Psych:  Well-appearing, normal affect, regular speech    Emergency Department Course     Imaging:  Radiology findings were communicated with the patient who voiced understanding of the findings.    Abd/pelvis CT no contrast - Stone Protocol  A 0.3 cm obstructing right UVJ stone causes mild right  hydronephrosis and hydroureter. Nonobstructing left renal collecting  system stone is present. No left hydronephrosis. No additional urinary  tract calculi. No other acute changes are evident in the abdomen or  pelvis to account for patient's symptoms. Appendix is normal.  RAMON DENISE MD  Reading per radiology    Abdomen US, limited (RUQ only)  Mild right hydronephrosis. No gallstones or bile duct  dilatation.  RAMON DENISE MD  Reading per radiology    Laboratory:  Laboratory findings were communicated with the patient who voiced understanding of the findings.    CBC: WBC 7.6, HGB 13.5,   CMP: calcium 8.4 (L) o/w WNL (Creatinine 0.55)  Lipase: 148  ISTAT HCG Quantitative Pregnancy: <5.0    UA with micro: blood large (A), RBC/HPF 71 (H), bacteria few (A), mucous present (A) o/w negative     Emergency Department Course:    0609 Nursing notes and vitals reviewed.    0612 IV was inserted and blood was drawn for laboratory testing, results above.     0612 The patient provided a urine sample here in the emergency department. This was sent for laboratory testing, findings above.     0642 I performed an exam of the patient as documented above.     0659 The patient was sent for an abdominal US while in the emergency department, results above.      0746 Recheck and update.     0821 The patient was sent for a abdominal CT while in the emergency department, results above.      0909 I personally reviewed the laboratory and imaging results with  the patient and answered all related questions prior to discharge.    Impression & Plan      Medical Decision Making:  Adrianna Rehman is a 33 year old female who presents to the emergency department today for evaluation of right-sided abdominal pain that started earlier this morning.  Pain improved while in the emergency department.  Vital signs stable.  Exam as above.  No peritoneal signs just perforated viscus.  Workup here reveals ureteral lithiasis with mild hydronephrosis which is likely the cause of patient's symptoms.  Urinalysis not concerning for concomitant urinary tract infection.  The rest of her labs are normal.  Repeat abdominal exam benign.  She is safe for discharge home with Flomax and short course of Norco for breakthrough pain but encourage Tylenol/ibuprofen.  Discussed side effects of both Flomax and opioids with the patient and significant other at bedside.  Referral made to [facility] for further consultation.  Return for new or worsening symptoms discussed at bedside.    Diagnosis:    ICD-10-CM    1. Ureterolithiasis N20.1    2. Renal colic N23      Disposition:   The patient is discharged to home.     Discharge Medications:  New Prescriptions    HYDROCODONE-ACETAMINOPHEN (NORCO) 5-325 MG TABLET    Take 1-2 tablets by mouth every 6 hours as needed for breakthrough pain or pain    TAMSULOSIN (FLOMAX) 0.4 MG CAPSULE    Take 1 capsule (0.4 mg) by mouth daily for 10 doses     Scribe Disclosure:  AHSAN, Lisa Chu, am serving as a scribe at 6:09 AM on 11/30/2018 to document services personally performed by Terry Royal MD based on my observations and the provider's statements to me.     [facility] EMERGENCY DEPARTMENT       Terry Royal MD  11/30/18 0982

## 2023-04-05 NOTE — DISCHARGE INSTRUCTIONS
Discharge Instructions  Kidney Stones    Kidney stones are a common problem that can cause a lot of pain but fortunately are usually not dangerous. Kidney stones form in the kidney and then can cause a blockage (obstruction) of the flow of urine from the kidney which leads to pain. Most patients can manage kidney stones at home (without a hospital stay).  However, sometimes your condition may be worse than it seemed at first, or may get worse with time. Most kidney stones will pass on their own, but occasionally stones may need to be removed by an urologist.    Generally, every Emergency Department visit should have a follow-up clinic visit with either a primary or a specialty clinic/provider. Please follow-up as instructed by your emergency provider today.      Return to the Emergency Department if:    Your pain is not controlled despite the medications provided or recommended.    You are vomiting (throwing up) and cannot keep fluids or medications down.    You develop a fever (>100.4 F).    You feel much more ill or develop new symptoms.  What can I do to help myself?    Be sure to drink plenty of fluids.    If instructed to do so, strain your urine (pee) with the urine strainer you were provided with today. Your stone may look like a grain of sand or a small pebble. Collect any stones in the cup provided and bring to your follow-up appointment.    Staying active is good, and may help the stone to pass. You may do whatever you feel up to doing without restrictions.   Treatment:    Non-steroidal anti-inflammatory drugs (NSAIDs). This includes prescription medicines like Toradol  (ketorolac) and non-prescription medicines like Advil  (ibuprofen) and Nuprin  (ibuprofen) and Naproxen. These pain relievers are very effective for kidney stones.    Nausea (sick to your stomach) medication.  Nausea and vomiting are common with kidney stones, so your provider may send you home with medicine for this.     Flomax   (tamsulosin). This medicine is sometimes used for men with prostate problems, but also can help kidney stones to pass. Its effectiveness is controversial or questionable so it is prescribed in certain situations. This medicine can lower blood pressure, and you may feel faint/lightheaded, especially when you first stand up. Be sure to get up gradually, sit down if you feel faint, and avoid activity where feeling faint would be dangerous, such as climbing ladders.  If you were given a prescription for medicine here today, be sure to read all of the information (including the package insert) that comes with your prescription.  This will include important information about the medicine, its side effects, and any warnings that you need to know about.  The pharmacist who fills the prescription can provide more information and answer questions you may have about the medicine.  If you have questions or concerns that the pharmacist cannot address, please call or return to the Emergency Department.   Remember that you can always come back to the Emergency Department if you are not able to see your regular provider in the amount of time listed above, if you get any new symptoms, or if there is anything that worries you.      Opioid Medication Information    You have been given a prescription for an opioid (narcotic) pain medicine and/or have received a pain medicine while here in the Emergency Department. These medicines can make you drowsy or impaired. You must not drive, operate dangerous equipment, or engage in any other dangerous activities while taking these medications. If you drive while taking these medications, you could be arrested for driving under the influence (DUI). Do not drink any alcohol while you are taking these medications.     Opioid pain medications can cause addiction. If you have a history of chemical dependency of any type, you are at a higher risk of becoming addicted to pain medications.  Only take  these prescribed medications to treat your pain when all other options have been tried. Take it for as short a time and as few doses as possible. Store your pain pills in a secure place, as they are frequently stolen and provide a dangerous opportunity for children or visitors in your house to start abusing these powerful medications. We will not replace any lost or stolen medicine.    If you do not finish your medication, it is a good idea to get rid of it but please do not flush it down the toilet. Please dispose of the remaining medication at a local pharmacy or law enforcement facility. The [place] [employer] has additional information on medication disposal: https://www.pca.[facility].[place]./living-green/managing-unwanted-medications.      Many prescription pain medications contain Tylenol  (acetaminophen), including Vicodin , Tylenol #3 , Norco , Lortab , and Percocet .  You should not take any extra pills of Tylenol  if you are using these prescription medications or you can get very sick.  Do not ever take more than 3000 mg of acetaminophen in any 24 hour period.    All opioids tend to cause constipation. Drink plenty of water and eat foods that have a lot of fiber, such as fruits, vegetables, prune juice, apple juice and high fiber cereal.  Take a laxative if you don t move your bowels at least every other day. Miralax , Milk of Magnesia, Colace , or Senna  can be used to keep you regular.         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 The surgical margins are positive for tumor cells.